# Patient Record
Sex: MALE | Race: WHITE | URBAN - METROPOLITAN AREA
[De-identification: names, ages, dates, MRNs, and addresses within clinical notes are randomized per-mention and may not be internally consistent; named-entity substitution may affect disease eponyms.]

---

## 2021-04-08 DIAGNOSIS — Z23 ENCOUNTER FOR IMMUNIZATION: ICD-10-CM

## 2023-09-05 ENCOUNTER — OFFICE VISIT (OUTPATIENT)
Dept: AUDIOLOGY | Age: 68
End: 2023-09-05
Payer: MEDICARE

## 2023-09-05 DIAGNOSIS — R42 DIZZINESS AND GIDDINESS: Primary | ICD-10-CM

## 2023-09-05 PROCEDURE — 92540 BASIC VESTIBULAR EVALUATION: CPT | Performed by: AUDIOLOGIST

## 2023-09-05 NOTE — PROGRESS NOTES
Videonystagmography (VNG) Evaluation    Name:  Amrita Pollard  :  1955  Age:  74 y.o. Date of Evaluation: 23     History: Dizziness  Reason for visit: Amrita Pollard is seen today at the request of Dr. Neelam Sky for an evaluation of balance. Patient complains of episodes of true spinning as well as episodes of lightheadedness. Tympanometry:   Right: Did not test   Left: Did not test    Oculomotor battery:    Gaze:          Right: Normal        Left: Normal         Up: Normal        Down: Normal      Tracking: Normal    Saccades: Normal     Optokinetic: Abnormal Gain at 40dps    Positioning/Positionals:     [employer]:    Right: Negative    2 degrees Rightbeating Nystagmus, 2 degrees Upbeating Nystagmus with head down    Left: Negative    6 degrees Leftbeating Nystagmus, 3 degrees Upbeating Nystagmus with head down     Horizontal Head Roll:  30 degrees supine 2 degrees Leftbeating nystagmus; Head Right 3 degrees Rightbeating nystagmus, Head Left 2 degrees Leftbeating nystagmus      Positionals:     Sitting: Normal    Supine: 2 degrees Leftbeating Nystagmus    Head Right: 2 degrees Rightbeating nystagmus, 3 degrees Upbeating nystagmus    Head Left: 4 degrees Leftbeating nystagmus    Body Right: Normal    Body Left[de-identified] Normal      Calorics:     [place] Caloric Irrigation: Did not test due to equipment failure    Results:  Pending calorics        Oculomotors essentially normal    Recommendations: Continue VNG at later date  Follow up with managing physician    Calorics to be scheduled upon equipment repair.     Haily Avelar., [facility]  Clinical Audiologist

## 2023-10-03 ENCOUNTER — OFFICE VISIT (OUTPATIENT)
Dept: AUDIOLOGY | Age: 68
End: 2023-10-03
Payer: MEDICARE

## 2023-10-03 DIAGNOSIS — R42 DIZZINESS AND GIDDINESS: Primary | ICD-10-CM

## 2023-10-03 PROCEDURE — 92567 TYMPANOMETRY: CPT | Performed by: AUDIOLOGIST

## 2023-10-03 PROCEDURE — 92537 CALORIC VSTBLR TEST W/REC: CPT | Performed by: AUDIOLOGIST

## 2023-10-03 NOTE — PROGRESS NOTES
Caloric Testing    Name:  Shelby Evans  :  1955  Age:  74 y.o. Date of Evaluation: 10/03/23     History: Dizziness  Reason for visit: Shelby Evans is seen today at the request of Jazmyn Martinez for an evaluation of balance. Patient is here for caloric testing following oculomotor and positional/positioning testing completed on 2023. Patient reports symptoms have gradually lessened since his last visit. Tympanometry:   Right: Type A - normal middle ear pressure and compliance   Left: Type A - normal middle ear pressure and compliance        Calorics:     [place] Caloric Irrigation: Normal (12% right weakness)        Results:   Normal  No significant peripheral findings  No significant central findings    Recommendations: Follow up with managing physician        Rich Jackson ,[facility]  Clinical Audiologist